# Patient Record
Sex: FEMALE | Race: WHITE | NOT HISPANIC OR LATINO | Employment: OTHER | URBAN - METROPOLITAN AREA
[De-identification: names, ages, dates, MRNs, and addresses within clinical notes are randomized per-mention and may not be internally consistent; named-entity substitution may affect disease eponyms.]

---

## 2023-10-03 ENCOUNTER — APPOINTMENT (OUTPATIENT)
Age: 68
End: 2023-10-03
Payer: COMMERCIAL

## 2023-10-03 DIAGNOSIS — E78.2 MIXED HYPERLIPIDEMIA: ICD-10-CM

## 2023-10-03 DIAGNOSIS — N18.4 CHRONIC KIDNEY DISEASE, STAGE IV (SEVERE) (HCC): ICD-10-CM

## 2023-10-03 DIAGNOSIS — I10 HYPERTENSION, ESSENTIAL: ICD-10-CM

## 2023-10-03 DIAGNOSIS — E11.65 TYPE 2 DIABETES MELLITUS WITH HYPERGLYCEMIA, WITH LONG-TERM CURRENT USE OF INSULIN (HCC): ICD-10-CM

## 2023-10-03 DIAGNOSIS — Z79.4 TYPE 2 DIABETES MELLITUS WITH HYPERGLYCEMIA, WITH LONG-TERM CURRENT USE OF INSULIN (HCC): ICD-10-CM

## 2023-10-03 LAB
ANION GAP SERPL CALCULATED.3IONS-SCNC: 5 MMOL/L
BUN SERPL-MCNC: 20 MG/DL (ref 5–25)
CALCIUM SERPL-MCNC: 8.4 MG/DL (ref 8.4–10.2)
CHLORIDE SERPL-SCNC: 110 MMOL/L (ref 96–108)
CHOLEST SERPL-MCNC: 139 MG/DL
CO2 SERPL-SCNC: 23 MMOL/L (ref 21–32)
CREAT SERPL-MCNC: 1.45 MG/DL (ref 0.6–1.3)
EST. AVERAGE GLUCOSE BLD GHB EST-MCNC: 252 MG/DL
GFR SERPL CREATININE-BSD FRML MDRD: 37 ML/MIN/1.73SQ M
GLUCOSE P FAST SERPL-MCNC: 248 MG/DL (ref 65–99)
HBA1C MFR BLD: 10.4 %
HDLC SERPL-MCNC: 47 MG/DL
LDLC SERPL CALC-MCNC: 46 MG/DL (ref 0–100)
NONHDLC SERPL-MCNC: 92 MG/DL
POTASSIUM SERPL-SCNC: 3.9 MMOL/L (ref 3.5–5.3)
SODIUM SERPL-SCNC: 138 MMOL/L (ref 135–147)
TRIGL SERPL-MCNC: 229 MG/DL
TSH SERPL DL<=0.05 MIU/L-ACNC: 4.39 UIU/ML (ref 0.45–4.5)

## 2023-10-03 PROCEDURE — 80061 LIPID PANEL: CPT

## 2023-10-03 PROCEDURE — 80048 BASIC METABOLIC PNL TOTAL CA: CPT

## 2023-10-03 PROCEDURE — 36415 COLL VENOUS BLD VENIPUNCTURE: CPT

## 2023-10-03 PROCEDURE — 83036 HEMOGLOBIN GLYCOSYLATED A1C: CPT

## 2023-10-03 PROCEDURE — 84443 ASSAY THYROID STIM HORMONE: CPT

## 2024-08-31 ENCOUNTER — APPOINTMENT (OUTPATIENT)
Age: 69
End: 2024-08-31
Payer: COMMERCIAL

## 2024-08-31 ENCOUNTER — OFFICE VISIT (OUTPATIENT)
Age: 69
End: 2024-08-31
Payer: COMMERCIAL

## 2024-08-31 VITALS
TEMPERATURE: 97.8 F | RESPIRATION RATE: 19 BRPM | WEIGHT: 193.12 LBS | HEART RATE: 86 BPM | BODY MASS INDEX: 36.46 KG/M2 | DIASTOLIC BLOOD PRESSURE: 72 MMHG | SYSTOLIC BLOOD PRESSURE: 182 MMHG | OXYGEN SATURATION: 98 % | HEIGHT: 61 IN

## 2024-08-31 DIAGNOSIS — R05.1 ACUTE COUGH: ICD-10-CM

## 2024-08-31 DIAGNOSIS — J18.9 PNEUMONIA OF LEFT LOWER LOBE DUE TO INFECTIOUS ORGANISM: Primary | ICD-10-CM

## 2024-08-31 PROCEDURE — 71046 X-RAY EXAM CHEST 2 VIEWS: CPT

## 2024-08-31 PROCEDURE — 99213 OFFICE O/P EST LOW 20 MIN: CPT | Performed by: PHYSICIAN ASSISTANT

## 2024-08-31 RX ORDER — ATORVASTATIN CALCIUM 80 MG/1
TABLET, FILM COATED ORAL
COMMUNITY
Start: 2024-07-13

## 2024-08-31 RX ORDER — ALBUTEROL SULFATE 90 UG/1
2 AEROSOL, METERED RESPIRATORY (INHALATION) 4 TIMES DAILY
COMMUNITY
Start: 2024-07-10 | End: 2025-07-10

## 2024-08-31 RX ORDER — FLURBIPROFEN SODIUM 0.3 MG/ML
SOLUTION/ DROPS OPHTHALMIC
COMMUNITY
Start: 2024-06-06

## 2024-08-31 RX ORDER — DOXYCYCLINE 100 MG/1
100 TABLET ORAL 2 TIMES DAILY
Qty: 14 TABLET | Refills: 0 | Status: SHIPPED | OUTPATIENT
Start: 2024-08-31 | End: 2024-09-07

## 2024-08-31 RX ORDER — GABAPENTIN 300 MG/1
300 CAPSULE ORAL DAILY
COMMUNITY
Start: 2024-04-30

## 2024-08-31 RX ORDER — MONTELUKAST SODIUM 10 MG/1
10 TABLET ORAL EVERY MORNING
COMMUNITY
Start: 2024-07-20

## 2024-08-31 RX ORDER — AMLODIPINE BESYLATE 5 MG/1
5 TABLET ORAL EVERY MORNING
COMMUNITY
Start: 2024-08-11

## 2024-08-31 RX ORDER — INSULIN DEGLUDEC 200 U/ML
INJECTION, SOLUTION SUBCUTANEOUS DAILY
COMMUNITY

## 2024-08-31 RX ORDER — CLOPIDOGREL BISULFATE 75 MG/1
75 TABLET ORAL DAILY
COMMUNITY
Start: 2024-04-30

## 2024-08-31 RX ORDER — EZETIMIBE 10 MG/1
10 TABLET ORAL EVERY MORNING
COMMUNITY
Start: 2024-06-16

## 2024-08-31 RX ORDER — LEVOTHYROXINE SODIUM 25 UG/1
25 TABLET ORAL
COMMUNITY
Start: 2024-07-27

## 2024-08-31 RX ORDER — GLIMEPIRIDE 1 MG/1
1 TABLET ORAL
COMMUNITY
Start: 2023-12-20 | End: 2024-12-19

## 2024-08-31 RX ORDER — ASPIRIN 81 MG/1
81 TABLET ORAL DAILY
COMMUNITY

## 2024-08-31 RX ORDER — MUPIROCIN 20 MG/G
OINTMENT TOPICAL
COMMUNITY
Start: 2024-07-09

## 2024-08-31 NOTE — PROGRESS NOTES
[facility]      NAME: Nani Prescott is a 69 y.o. female  : 1955    MRN: [id number]  DATE: 2024  TIME: 10:52 AM    Assessment and Plan   Pneumonia of left lower lobe due to infectious organism [J18.9]  1. Pneumonia of left lower lobe due to infectious organism  XR chest pa and lateral    doxycycline (ADOXA) 100 MG tablet          Patient Instructions   I have prescribed an antibiotic for the infection.  Please take the antibiotic as prescribed and finish the entire prescription.  I recommend that the patient takes an over the counter probiotic or eats yogurt with live cultures in it (activia) to keep good bacteria in the gut and help prevent diarrhea.  Wash hands frequently to prevent the spread of infection.  Can use over the counter cough and cold medications to help with symptoms.  Ibuprofen and/or tylenol as needed for pain or fever.  If not improving over the next 3-5 days, follow up with PCP.    Risks and benefits discussed. Patient understands and agrees with the plan.      If tests have been performed at [facility], our office will contact you with results if changes need to be made to the care plan discussed with you at the visit.  You can review your full results on [facility]'s MyChart.     Follow up with PCP in 3-5 days.      If any of the following occur, please report to your nearest ED for evaluation or call 911.   Difficultly breathing or shortness of breath  Chest pain  Acutely worsening symptoms.   To present to the ER if symptoms worsen.  Chief Complaint     Chief Complaint   Patient presents with    Cold Like Symptoms     Sx onset Tuesday - worsening since onset. Throat irritation, congestion, cough, post nasal drip, SOB, wheezing. OTC - tylenol         History of Present Illness   Nani Prescott presents to the clinic c/o    URI   This is a new problem. The current episode started in the past 7 days (). The problem has been unchanged. There has been no fever.  Associated symptoms include congestion, coughing, diarrhea, a sore throat, vomiting and wheezing. Pertinent negatives include no abdominal pain, chest pain, dysuria, ear pain, headaches or rash. She has tried acetaminophen for the symptoms. The treatment provided mild relief.       Review of Systems   Review of Systems   Constitutional:  Positive for fatigue. Negative for chills and fever.   HENT:  Positive for congestion and sore throat. Negative for ear discharge and ear pain.    Eyes:  Negative for pain and visual disturbance.   Respiratory:  Positive for cough and wheezing.    Cardiovascular:  Negative for chest pain and palpitations.   Gastrointestinal:  Positive for diarrhea and vomiting. Negative for abdominal pain.   Genitourinary:  Negative for dysuria and hematuria.   Musculoskeletal:  Negative for arthralgias and back pain.   Skin:  Negative for color change and rash.   Neurological:  Negative for seizures, syncope and headaches.   All other systems reviewed and are negative.        Current Medications     Long-Term Medications   Medication Sig Dispense Refill    amLODIPine (NORVASC) 5 mg tablet Take 5 mg by mouth every morning      aspirin (ECOTRIN LOW STRENGTH) 81 mg EC tablet Take 81 mg by mouth daily      atorvastatin (LIPITOR) 80 mg tablet TAKE 1 TABLET (80 MG TOTAL) BY MOUTH IN THE MORNING      B-D UF III MINI PEN NEEDLES 31G X 5 MM MISC USE 1 NIGHTLY FOR TRESIBA AND 1 WEEKLY FOR OZEMPIC      clopidogrel (PLAVIX) 75 mg tablet Take 75 mg by mouth daily      ezetimibe (ZETIA) 10 mg tablet Take 10 mg by mouth every morning      gabapentin (NEURONTIN) 300 mg capsule Take 300 mg by mouth daily      glimepiride (AMARYL) 1 mg tablet Take 1 mg by mouth      levothyroxine 25 mcg tablet Take 25 mcg by mouth daily before breakfast Take 30 minutes prior      montelukast (SINGULAIR) 10 mg tablet Take 10 mg by mouth every morning      mupirocin (BACTROBAN) 2 % ointment APPLY TOPICALLY 3 TIMES A DAY FOR 7 DAYS    "   Tresiba FlexTouch 200 units/mL CONCENTRATED U-200 injection pen Inject under the skin daily         Current Allergies     Allergies as of 08/31/2024    (No Known Allergies)            The following portions of the patient's history were reviewed and updated as appropriate: allergies, current medications, past family history, past medical history, past social history, past surgical history and problem list.  Past Medical History:   Diagnosis Date    Allergic     Diabetes mellitus (HCC)     Disease of thyroid gland     Hypertension     Osteoporosis      Past Surgical History:   Procedure Laterality Date    CHOLECYSTECTOMY      EYE SURGERY      FOOT SURGERY Right      Social History     Socioeconomic History    Marital status: /Civil Union     Spouse name: Not on file    Number of children: Not on file    Years of education: Not on file    Highest education level: Not on file   Occupational History    Not on file   Tobacco Use    Smoking status: Never     Passive exposure: Never    Smokeless tobacco: Never   Vaping Use    Vaping status: Never Used   Substance and Sexual Activity    Alcohol use: Never    Drug use: Never    Sexual activity: Not on file   Other Topics Concern    Not on file   Social History Narrative    Not on file     Social Determinants of Health     Financial Resource Strain: Low Risk  (7/9/2024)    Received from [facility]    Overall Financial Resource Strain (CARDIA)     Difficulty of Paying Living Expenses: Not hard at all   Food Insecurity: Not on file   Transportation Needs: Not on file   Physical Activity: Not on file   Stress: Not on file   Social Connections: Not on file   Intimate Partner Violence: Not on file   Housing Stability: Not on file       Objective   BP (!) 182/72 (BP Location: Left arm, Patient Position: Sitting, Cuff Size: Standard)   Pulse 86   Temp 97.8 °F (36.6 °C) (Tympanic)   Resp 19   Ht 5' 1\" (1.549 m)   Wt 87.6 kg (193 lb 2 oz)   SpO2 98%   BMI 36.49 " kg/m²      Physical Exam     Physical Exam  Vitals and nursing note reviewed.   Constitutional:       General: She is not in acute distress.     Appearance: She is well-developed. She is not diaphoretic.   HENT:      Head: Normocephalic and atraumatic.      Right Ear: Tympanic membrane and external ear normal.      Left Ear: Tympanic membrane and external ear normal.      Nose: Nose normal.      Mouth/Throat:      Mouth: Mucous membranes are moist.      Pharynx: No oropharyngeal exudate or posterior oropharyngeal erythema.   Eyes:      General: No scleral icterus.        Right eye: No discharge.         Left eye: No discharge.      Conjunctiva/sclera: Conjunctivae normal.   Cardiovascular:      Rate and Rhythm: Normal rate and regular rhythm.      Heart sounds: Normal heart sounds. No murmur heard.     No friction rub. No gallop.   Pulmonary:      Effort: Pulmonary effort is normal. No respiratory distress.      Breath sounds: Examination of the right-lower field reveals decreased breath sounds. Examination of the left-lower field reveals decreased breath sounds. Decreased breath sounds present. No wheezing, rhonchi or rales.   Skin:     General: Skin is warm and dry.      Coloration: Skin is not pale.      Findings: No erythema or rash.   Neurological:      Mental Status: She is alert and oriented to person, place, and time.   Psychiatric:         Behavior: Behavior normal.         Thought Content: Thought content normal.         Judgment: Judgment normal.         Gita Spangler PA-C

## 2024-10-13 ENCOUNTER — OFFICE VISIT (OUTPATIENT)
Age: 69
End: 2024-10-13
Payer: COMMERCIAL

## 2024-10-13 VITALS
HEART RATE: 81 BPM | HEIGHT: 61 IN | BODY MASS INDEX: 36.38 KG/M2 | DIASTOLIC BLOOD PRESSURE: 70 MMHG | WEIGHT: 192.68 LBS | TEMPERATURE: 97.8 F | SYSTOLIC BLOOD PRESSURE: 170 MMHG | RESPIRATION RATE: 18 BRPM | OXYGEN SATURATION: 96 %

## 2024-10-13 DIAGNOSIS — R60.0 BILATERAL LOWER EXTREMITY EDEMA: ICD-10-CM

## 2024-10-13 DIAGNOSIS — R06.02 SHORTNESS OF BREATH: Primary | ICD-10-CM

## 2024-10-13 PROCEDURE — 99213 OFFICE O/P EST LOW 20 MIN: CPT

## 2024-10-13 RX ORDER — HYDRALAZINE HYDROCHLORIDE 100 MG/1
100 TABLET, FILM COATED ORAL EVERY 8 HOURS
COMMUNITY
Start: 2024-09-16 | End: 2025-01-14

## 2024-10-13 RX ORDER — SEVELAMER CARBONATE 800 MG/1
1600 TABLET, FILM COATED ORAL
COMMUNITY
Start: 2024-09-16 | End: 2024-10-16

## 2024-10-13 RX ORDER — CARVEDILOL 6.25 MG/1
6.25 TABLET ORAL
COMMUNITY
Start: 2024-09-16 | End: 2025-01-14

## 2024-10-13 RX ORDER — FERROUS SULFATE 325(65) MG
325 TABLET ORAL
COMMUNITY
Start: 2024-09-16 | End: 2024-10-16

## 2024-10-13 RX ORDER — BUMETANIDE 2 MG/1
2 TABLET ORAL
COMMUNITY
Start: 2024-09-16 | End: 2024-10-16

## 2024-10-13 NOTE — PROGRESS NOTES
[facility]        NAME: Nani Prescott is a 69 y.o. female  : 1955    MRN: [id number]  DATE: 2024  TIME: 2:04 PM    Assessment and Plan   Shortness of breath [R06.02]  1. Shortness of breath        2. Bilateral lower extremity edema        Patient will proceed to [facility] ED for further evaluation and treatment of lower extremity edema and shortness of breath with hx of CHF. Will be driven by daughter, declines EMS transport at this time. She is stable at time of exam.   Second BP documented which was 170/70  Patient Instructions     Follow up with PCP in 3-5 days.  Proceed to ER if symptoms worsen.    If tests have been performed at [facility], our office will contact you with results if changes need to be made to the care plan discussed with you at the visit.  You can review your full results on [facility] MyChart.    Chief Complaint     Chief Complaint   Patient presents with    Constipation     Pt. States she is constipated and feeling bloated. Pt. Also states she feels like she is retaining water, hands are swollen      History of Present Illness     Pt is a 69-year-old female with PMH of CHF presenting complaining of feeling more swollen in her hands and legs.   She states it started over the past couple of days. She was recently admitted for an acute exacerbation of CHF.   She notes she overall feels tired, short of breath, dyspnea on exertion, and like she is swelling up. She also notes bloating and constipation.     Constipation  Pertinent negatives include no fever, nausea or vomiting.       Review of Systems   Review of Systems   Constitutional:  Positive for fatigue. Negative for chills and fever.   HENT: Negative.     Respiratory:  Positive for shortness of breath and wheezing. Negative for stridor.    Cardiovascular:  Positive for leg swelling. Negative for chest pain and palpitations.   Gastrointestinal:  Positive for constipation. Negative for nausea and  vomiting.   Genitourinary: Negative.    Musculoskeletal: Negative.    Neurological: Negative.  Negative for seizures and syncope.         Current Medications       Current Outpatient Medications:     amLODIPine (NORVASC) 5 mg tablet, Take 5 mg by mouth every morning, Disp: , Rfl:     aspirin (ECOTRIN LOW STRENGTH) 81 mg EC tablet, Take 81 mg by mouth daily, Disp: , Rfl:     atorvastatin (LIPITOR) 80 mg tablet, TAKE 1 TABLET (80 MG TOTAL) BY MOUTH IN THE MORNING, Disp: , Rfl:     bumetanide (BUMEX) 2 mg tablet, Take 2 mg by mouth, Disp: , Rfl:     carvedilol (COREG) 6.25 mg tablet, Take 6.25 mg by mouth, Disp: , Rfl:     clopidogrel (PLAVIX) 75 mg tablet, Take 75 mg by mouth daily, Disp: , Rfl:     ezetimibe (ZETIA) 10 mg tablet, Take 10 mg by mouth every morning, Disp: , Rfl:     ferrous sulfate 325 (65 Fe) mg tablet, Take 325 mg by mouth, Disp: , Rfl:     gabapentin (NEURONTIN) 300 mg capsule, Take 300 mg by mouth daily, Disp: , Rfl:     glimepiride (AMARYL) 1 mg tablet, Take 1 mg by mouth, Disp: , Rfl:     hydrALAZINE (APRESOLINE) 100 MG tablet, Take 100 mg by mouth every 8 (eight) hours, Disp: , Rfl:     levothyroxine 25 mcg tablet, Take 25 mcg by mouth daily before breakfast Take 30 minutes prior, Disp: , Rfl:     montelukast (SINGULAIR) 10 mg tablet, Take 10 mg by mouth every morning, Disp: , Rfl:     mupirocin (BACTROBAN) 2 % ointment, APPLY TOPICALLY 3 TIMES A DAY FOR 7 DAYS, Disp: , Rfl:     sevelamer carbonate (RENVELA) 800 mg tablet, Take 1,600 mg by mouth, Disp: , Rfl:     Tresiba FlexTouch 200 units/mL CONCENTRATED U-200 injection pen, Inject under the skin daily, Disp: , Rfl:     VITAMIN D PO, Take 1,000 Units by mouth daily, Disp: , Rfl:     albuterol (PROVENTIL HFA,VENTOLIN HFA) 90 mcg/act inhaler, Inhale 2 puffs 4 (four) times a day, Disp: , Rfl:     B-D UF III MINI PEN NEEDLES 31G X 5 MM MISC, USE 1 NIGHTLY FOR TRESIBA AND 1 WEEKLY FOR OZEMPIC, Disp: , Rfl:     Current Allergies     Allergies as of  "10/13/2024    (No Known Allergies)            The following portions of the patient's history were reviewed and updated as appropriate: allergies, current medications, past family history, past medical history, past social history, past surgical history and problem list.     Past Medical History:   Diagnosis Date    Allergic     Chronic kidney disease     Diabetes mellitus (HCC)     Disease of thyroid gland     Hypertension     Osteoporosis        Past Surgical History:   Procedure Laterality Date    CHOLECYSTECTOMY      EYE SURGERY      FOOT SURGERY Right        History reviewed. No pertinent family history.      Medications have been verified.        Objective   /70 (BP Location: Right arm, Patient Position: Sitting)   Pulse 81   Temp 97.8 °F (36.6 °C)   Resp 18   Ht 5' 1\" (1.549 m)   Wt 87.4 kg (192 lb 10.9 oz)   SpO2 96%   BMI 36.41 kg/m²   No LMP recorded. Patient is postmenopausal.       Physical Exam     Physical Exam  Vitals and nursing note reviewed.   Constitutional:       General: She is not in acute distress.     Appearance: Normal appearance. She is normal weight. She is ill-appearing. She is not toxic-appearing or diaphoretic.   HENT:      Head: Normocephalic and atraumatic.      Right Ear: External ear normal.      Left Ear: External ear normal.      Nose: Nose normal.      Mouth/Throat:      Mouth: Mucous membranes are moist.      Pharynx: Oropharynx is clear.   Eyes:      Conjunctiva/sclera: Conjunctivae normal.   Cardiovascular:      Rate and Rhythm: Normal rate.      Pulses: Normal pulses.      Heart sounds: Normal heart sounds. No murmur heard.     No friction rub. No gallop.   Pulmonary:      Effort: Pulmonary effort is normal. No respiratory distress.      Breath sounds: Normal breath sounds. No stridor. No wheezing, rhonchi or rales.   Chest:      Chest wall: No tenderness.   Musculoskeletal:         General: Normal range of motion.      Right lower leg: Edema present.      Left " lower leg: Edema present.   Skin:     General: Skin is warm and dry.      Capillary Refill: Capillary refill takes less than 2 seconds.   Neurological:      General: No focal deficit present.      Mental Status: She is alert. Mental status is at baseline.   Psychiatric:         Mood and Affect: Mood normal.